# Patient Record
Sex: MALE | Race: WHITE | NOT HISPANIC OR LATINO | URBAN - METROPOLITAN AREA
[De-identification: names, ages, dates, MRNs, and addresses within clinical notes are randomized per-mention and may not be internally consistent; named-entity substitution may affect disease eponyms.]

---

## 2022-11-23 DIAGNOSIS — R23.2 FACIAL FLUSHING: Primary | ICD-10-CM

## 2022-11-23 NOTE — PROGRESS NOTES
ASSESSMENT/PLAN:                                                        1.  He has flushing triggered by consumption of alcoholic beverages and possibly by ethnic food.  Flushing can be associated with many disorders and many of them are benign but I am going to order some lab evaluation to rule out the more serious concerns including carcinoid and pheochromocytoma.  Other possibilities include mastocytosis.  I am going to order a complete blood count and liver function tests and 24-hour urine for 5 HIAA and serum tryptase and 24-hour urine for catecholamines and fractionated metanephrines to rule out carcinoid and mastocytosis and pheochromocytoma.  Depending upon those results we may decide to do further evaluation for allergy or other rare causes such as renal cell carcinoma or pancreatic VIPoma or medullary thyroid carcinoma.  I will contact him to set up an appointment sometime in the near future.  I did reassure him at this point that I am happy to hear that his weight has been stable which is a good indication that this is not severe.  Also he has symptoms intermittently and only when he drinks which is also reassuring to me.                                                                    SUBJECTIVE:                                                      Mr. Ozuna is a very pleasant 41-year-old male who contacted me yesterday by phone to set up an appointment in the near future because of a history of flushing.  It began probably about a year ago but more noticeable over the last 2 to 3 months.  He states that he drinks alcohol may be once every 2 weeks and every time he drinks an alcoholic beverage he gets chills and feels feverish and has the shakes and gets flushing of his face and then has diarrhea or loose stools which are watery but not bloody.  This has been happening for at least the last 3 months.  His weight has remained stable.  He has no vomiting but he does have mild nausea with these episodes.   This seems to also be triggered by ethnic food.  Almost every other food is tolerated well without any flushing.  He currently lives in [place] but is planning to come home in December and wanted to have some testing done to see if he can identify the reason for the flushing.  He used to be able to tolerate alcohol without any difficulty and drink more frequently but at most 2 drinks twice a month now and only socially.  Never to excess.  He is wondering if he could have an alcohol allergy or some other reason for the flushing.  The effects from the drinking seem to happen 1 to 2 hours after he drinks.    He informs me that his past surgical history is consistent with tonsillectomy as a child but no other surgeries.  He has allergies to mold but not any medication.  He does not take any chronic medications.  Otherwise his past medical history is unremarkable.    There is no problem list on file for this patient.    No past surgical history on file.    Social History     Tobacco Use     Smoking status: Not on file     Smokeless tobacco: Not on file   Substance Use Topics     Alcohol use: Not on file     No family history on file.      No current outpatient medications on file.       ROS:  A 12 point systems review is negative except for what is listed above in the Subjective history.        Wt Readings from Last 3 Encounters:   No data found for Wt                     BP Readings from Last 6 Encounters:   No data found for BP          OBJECTIVE:                                                    Vital signs: There were no vitals taken for this visit.                     Guillermo Kay MD  [facility]       The above information was dictating using Dragon voice software and as a result there may be some irregularities that were not detected in my review of this note.

## 2022-11-27 DIAGNOSIS — R23.2 FACIAL FLUSHING: Primary | ICD-10-CM

## 2022-11-30 DIAGNOSIS — R23.2 FACIAL FLUSHING: Primary | ICD-10-CM

## 2022-12-19 ENCOUNTER — LAB (OUTPATIENT)
Dept: LAB | Facility: OTHER | Age: 42
End: 2022-12-19

## 2022-12-19 ENCOUNTER — ANCILLARY PROCEDURE (OUTPATIENT)
Dept: GENERAL RADIOLOGY | Facility: OTHER | Age: 42
End: 2022-12-19
Attending: OBSTETRICS & GYNECOLOGY

## 2022-12-19 DIAGNOSIS — R23.2 FACIAL FLUSHING: ICD-10-CM

## 2022-12-19 LAB
ALT SERPL W P-5'-P-CCNC: 27 U/L (ref 0–70)
AST SERPL W P-5'-P-CCNC: 17 U/L (ref 0–45)
BASOPHILS # BLD AUTO: 0 10E3/UL (ref 0–0.2)
BASOPHILS NFR BLD AUTO: 0 %
CRP SERPL-MCNC: <2.9 MG/L (ref 0–8)
EOSINOPHIL # BLD AUTO: 0.1 10E3/UL (ref 0–0.7)
EOSINOPHIL NFR BLD AUTO: 1 %
ERYTHROCYTE [DISTWIDTH] IN BLOOD BY AUTOMATED COUNT: 13 % (ref 10–15)
ERYTHROCYTE [SEDIMENTATION RATE] IN BLOOD BY WESTERGREN METHOD: 2 MM/HR (ref 0–15)
HCT VFR BLD AUTO: 44 % (ref 40–53)
HGB BLD-MCNC: 14.6 G/DL (ref 13.3–17.7)
LDH SERPL L TO P-CCNC: 161 U/L (ref 85–227)
LYMPHOCYTES # BLD AUTO: 3.1 10E3/UL (ref 0.8–5.3)
LYMPHOCYTES NFR BLD AUTO: 50 %
MCH RBC QN AUTO: 29.8 PG (ref 26.5–33)
MCHC RBC AUTO-ENTMCNC: 33.2 G/DL (ref 31.5–36.5)
MCV RBC AUTO: 90 FL (ref 78–100)
MONOCYTES # BLD AUTO: 0.5 10E3/UL (ref 0–1.3)
MONOCYTES NFR BLD AUTO: 8 %
NEUTROPHILS # BLD AUTO: 2.5 10E3/UL (ref 1.6–8.3)
NEUTROPHILS NFR BLD AUTO: 40 %
PLATELET # BLD AUTO: 249 10E3/UL (ref 150–450)
RBC # BLD AUTO: 4.9 10E6/UL (ref 4.4–5.9)
TSH SERPL DL<=0.005 MIU/L-ACNC: 0.82 MU/L (ref 0.4–4)
WBC # BLD AUTO: 6.2 10E3/UL (ref 4–11)

## 2022-12-19 PROCEDURE — 71046 X-RAY EXAM CHEST 2 VIEWS: CPT | Mod: TC | Performed by: RADIOLOGY

## 2022-12-19 PROCEDURE — 36415 COLL VENOUS BLD VENIPUNCTURE: CPT

## 2022-12-19 PROCEDURE — 86140 C-REACTIVE PROTEIN: CPT

## 2022-12-19 PROCEDURE — 84443 ASSAY THYROID STIM HORMONE: CPT

## 2022-12-19 PROCEDURE — 83615 LACTATE (LD) (LDH) ENZYME: CPT

## 2022-12-19 PROCEDURE — 84450 TRANSFERASE (AST) (SGOT): CPT

## 2022-12-19 PROCEDURE — 84460 ALANINE AMINO (ALT) (SGPT): CPT

## 2022-12-19 PROCEDURE — 85025 COMPLETE CBC W/AUTO DIFF WBC: CPT

## 2022-12-19 PROCEDURE — 85652 RBC SED RATE AUTOMATED: CPT

## 2022-12-19 PROCEDURE — 83520 IMMUNOASSAY QUANT NOS NONAB: CPT

## 2022-12-20 LAB — TRYPTASE SERPL-MCNC: 4.4 UG/L

## 2022-12-21 ENCOUNTER — APPOINTMENT (OUTPATIENT)
Dept: LAB | Facility: OTHER | Age: 42
End: 2022-12-21

## 2022-12-21 PROCEDURE — 83497 ASSAY OF 5-HIAA: CPT | Mod: 90

## 2022-12-21 PROCEDURE — 99000 SPECIMEN HANDLING OFFICE-LAB: CPT

## 2022-12-21 PROCEDURE — 83835 ASSAY OF METANEPHRINES: CPT | Mod: 90

## 2022-12-21 PROCEDURE — 82384 ASSAY THREE CATECHOLAMINES: CPT | Mod: 90

## 2022-12-23 LAB
CREAT 24H UR-MRATE: 1989 MG/D
CREAT UR-MCNC: 68 MG/DL
METANEPH 24H UR-MCNC: 56 UG/L
METANEPH 24H UR-MRATE: 164 UG/D
METANEPH+NORMETANEPH UR-IMP: NORMAL
METANEPH/CREAT 24H UR: 82 UG/G CRT
NORMETANEPHRINE 24H UR-MCNC: 80 UG/L
NORMETANEPHRINE 24H UR-MRATE: 234 UG/D
NORMETANEPHRINE/CREAT 24H UR: 118 UG/G CRT

## 2022-12-24 LAB
5HIAA & CREATININE UR-IMP: NORMAL
5OH-INDOLEACETATE 24H UR-MCNC: 1.2 MG/L
5OH-INDOLEACETATE 24H UR-MRATE: 4 MG/D
5OH-INDOLEACETATE/CREAT 24H UR: 2 MG/GCR
CATECHOLS UR-IMP: NORMAL
CREAT 24H UR-MRATE: 1989 MG/D
CREAT 24H UR-MRATE: 1989 MG/D
CREAT UR-MCNC: 68 MG/DL
CREAT UR-MCNC: 68 MG/DL
DOPAMINE 24H UR-MRATE: 161 UG/D
DOPAMINE UR-MCNC: 55 UG/L
DOPAMINE/CREAT UR: 81 UG/G CRT
EPINEPH 24H UR-MRATE: 12 UG/D
EPINEPH UR-MCNC: 4 UG/L
EPINEPH/CREAT UR: 6 UG/G CRT
NOREPINEPH 24H UR-MRATE: 47 UG/D
NOREPINEPH UR-MCNC: 16 UG/L
NOREPINEPH/CREAT UR: 24 UG/G CRT

## 2023-02-12 ENCOUNTER — HEALTH MAINTENANCE LETTER (OUTPATIENT)
Age: 43
End: 2023-02-12

## 2024-03-10 ENCOUNTER — HEALTH MAINTENANCE LETTER (OUTPATIENT)
Age: 44
End: 2024-03-10

## 2025-03-16 ENCOUNTER — HEALTH MAINTENANCE LETTER (OUTPATIENT)
Age: 45
End: 2025-03-16